# Patient Record
Sex: MALE | Race: WHITE | NOT HISPANIC OR LATINO | Employment: FULL TIME | URBAN - METROPOLITAN AREA
[De-identification: names, ages, dates, MRNs, and addresses within clinical notes are randomized per-mention and may not be internally consistent; named-entity substitution may affect disease eponyms.]

---

## 2024-03-04 ENCOUNTER — OFFICE VISIT (OUTPATIENT)
Dept: URGENT CARE | Facility: CLINIC | Age: 64
End: 2024-03-04
Payer: COMMERCIAL

## 2024-03-04 VITALS
SYSTOLIC BLOOD PRESSURE: 166 MMHG | OXYGEN SATURATION: 98 % | HEART RATE: 77 BPM | RESPIRATION RATE: 17 BRPM | WEIGHT: 216.5 LBS | DIASTOLIC BLOOD PRESSURE: 72 MMHG | TEMPERATURE: 98 F

## 2024-03-04 DIAGNOSIS — R11.2 NAUSEA AND VOMITING, UNSPECIFIED VOMITING TYPE: ICD-10-CM

## 2024-03-04 DIAGNOSIS — R10.9 RIGHT FLANK PAIN: Primary | ICD-10-CM

## 2024-03-04 DIAGNOSIS — R81 GLUCOSURIA: ICD-10-CM

## 2024-03-04 LAB
BILIRUB UR QL STRIP: NEGATIVE
GLUCOSE SERPL-MCNC: 182 MG/DL (ref 70–110)
GLUCOSE UR QL STRIP: POSITIVE
KETONES UR QL STRIP: NEGATIVE
LEUKOCYTE ESTERASE UR QL STRIP: NEGATIVE
PH, POC UA: 5 (ref 5–8)
POC BLOOD, URINE: POSITIVE
POC NITRATES, URINE: NEGATIVE
PROT UR QL STRIP: NEGATIVE
SP GR UR STRIP: 1.01 (ref 1–1.03)
UROBILINOGEN UR STRIP-ACNC: NORMAL (ref 0.3–2.2)

## 2024-03-04 PROCEDURE — 99204 OFFICE O/P NEW MOD 45 MIN: CPT | Mod: S$GLB,,, | Performed by: FAMILY MEDICINE

## 2024-03-04 PROCEDURE — 81003 URINALYSIS AUTO W/O SCOPE: CPT | Mod: QW,S$GLB,, | Performed by: FAMILY MEDICINE

## 2024-03-04 PROCEDURE — 82962 GLUCOSE BLOOD TEST: CPT | Mod: S$GLB,,, | Performed by: FAMILY MEDICINE

## 2024-03-04 RX ORDER — DICYCLOMINE HYDROCHLORIDE 10 MG/5ML
20 SOLUTION ORAL
Status: COMPLETED | OUTPATIENT
Start: 2024-03-04 | End: 2024-03-04

## 2024-03-04 RX ORDER — LIDOCAINE HYDROCHLORIDE 20 MG/ML
10 SOLUTION OROPHARYNGEAL ONCE
Status: COMPLETED | OUTPATIENT
Start: 2024-03-04 | End: 2024-03-04

## 2024-03-04 RX ORDER — LISINOPRIL 20 MG/1
20 TABLET ORAL DAILY
COMMUNITY
Start: 2024-01-02

## 2024-03-04 RX ORDER — LISINOPRIL 10 MG/1
10 TABLET ORAL DAILY
COMMUNITY
Start: 2024-02-29

## 2024-03-04 RX ORDER — ALUMINUM HYDROXIDE, MAGNESIUM HYDROXIDE, AND SIMETHICONE 1200; 120; 1200 MG/30ML; MG/30ML; MG/30ML
30 SUSPENSION ORAL
Status: COMPLETED | OUTPATIENT
Start: 2024-03-04 | End: 2024-03-04

## 2024-03-04 RX ORDER — EZETIMIBE 10 MG/1
10 TABLET ORAL DAILY
COMMUNITY
Start: 2024-02-29

## 2024-03-04 RX ORDER — ONDANSETRON HYDROCHLORIDE 8 MG/1
8 TABLET, FILM COATED ORAL EVERY 8 HOURS PRN
Qty: 12 TABLET | Refills: 0 | Status: SHIPPED | OUTPATIENT
Start: 2024-03-04

## 2024-03-04 RX ADMIN — LIDOCAINE HYDROCHLORIDE 10 ML: 20 SOLUTION OROPHARYNGEAL at 12:03

## 2024-03-04 RX ADMIN — DICYCLOMINE HYDROCHLORIDE 20 MG: 10 SOLUTION ORAL at 12:03

## 2024-03-04 RX ADMIN — ALUMINUM HYDROXIDE, MAGNESIUM HYDROXIDE, AND SIMETHICONE 30 ML: 1200; 120; 1200 SUSPENSION ORAL at 12:03

## 2024-03-04 NOTE — PATIENT INSTRUCTIONS
IT COULD BE THAT YOUR ABDOMINAL DISCOMFORT IS FROM CONSTIPATION.    IT IS REASONABLE AT THIS POINT TO TRY A FLEETS ENEMA AND SEE IF THIS GIVES YOU ANY RESULT.    IF THIS RELIEVES YOUR SYMPTOMS, THEN TRY MIRALAX 1 SCOOP DAILY FOR 3 DAYS.  IF NO RESULT AFTER 3 DAYS, INCREASE TO 2 SCOOPS DAILY FOR 3 DAYS.   IF NO RESULT AFTER 3 DAYS, INCREASE TO 3 SCOOPS DAILY.     AT ANY POINT ONCE YOU START HAVING SOFT FORMED STOOLS, DECREASE DOWN TO 1 SCOOP OF MIRALAX EVERY DAY.    BE SURE TO FOLLOW-UP WITH YOUR PRIMARY CARE PROVIDER ONCE HOME REGARDING THIS PAIN, BUT ALSO THE BLOOD IN YOUR URINE, YOUR DIABETES, AND YOUR BLOOD PRESSURE.    WITH ANY UNCONTROLLED ABDOMINAL PAIN, FEVER OR OTHER PROBLEMS, GO IMMEDIATELY TO THE EMERGENCY ROOM.

## 2024-03-04 NOTE — PROGRESS NOTES
Subjective:      Patient ID: Guy Roach is a 63 y.o. male.    Vitals:  weight is 98.2 kg (216 lb 7.9 oz). His oral temperature is 98 °F (36.7 °C). His blood pressure is 166/72 (abnormal) and his pulse is 77. His respiration is 17 and oxygen saturation is 98%.     Chief Complaint: Flank Pain    63-year-old male in town from [place] for a conference.  He felt perfectly well last evening, was out to dinner with some colleagues last night and had 3 chargrilled oysters, some crawfish etouffe and a Coke.  While drinking the Coke, he felt his stomach beginning to roil.  Between midnight and 6:00 am, he estimates 6 episodes of vomiting.  Since then he has had 2 episodes of vomiting.  His main complaint however upon arrival was right flank pain.  No dysuria or frequency or urgency.  No history of nephrolithiasis.  His thoughts are that he ate some food that was causing this nausea and vomiting.  No others with similar symptoms.  Also with complaints of constant belching/ burping.  Status post previous cholecystectomy.  No epigastric pain    Past medical history is significant for hypertension and diabetes.  He is currently on metformin which he reports causes GI issues for him.  We discussed possibility of constipation.  He reports unformed stools daily and not large which he attributes to the metformin.  He did not have a BM yesterday.    He did not take his diabetes medication or blood pressure medication this morning.  He has had no fever    Prior to this appointment he did take some Maalox with very brief benefit (10 minutes) of the gaseous upset.    While here in urgent Care, he was given a GI cocktail.  This resulted in resolution of the right flank pain but now he reports bilateral lower abdominal pressure/slight upset. (nontender on repeat exam).          Flank Pain  The current episode started yesterday. The problem occurs constantly. The problem is unchanged. The quality of the pain is described as aching. The  pain is at a severity of 4/10. The pain is mild. The pain is The same all the time. Pertinent negatives include no abdominal pain, bladder incontinence, bowel incontinence, chest pain, dysuria, fever, headaches, leg pain, numbness, paresis, paresthesias, pelvic pain, perianal numbness, tingling, weakness or weight loss. He has tried nothing for the symptoms.       Constitution: Negative for fever.   Cardiovascular:  Negative for chest pain.   Gastrointestinal:  Negative for abdominal pain and bowel incontinence.   Genitourinary:  Positive for flank pain. Negative for dysuria, bladder incontinence and pelvic pain.   Neurological:  Negative for headaches and numbness.      Objective:     Physical Exam   Constitutional: He is oriented to person, place, and time. He appears well-developed.  Non-toxic appearance. He does not appear ill. No distress.   HENT:   Head: Normocephalic and atraumatic.   Ears:   Right Ear: External ear normal.   Left Ear: External ear normal.   Cardiovascular: Normal rate and regular rhythm.   Pulmonary/Chest: Effort normal. No stridor. No respiratory distress. He has no wheezes. He has no rhonchi. He has no rales.   Abdominal: Normal appearance.      Comments: Bowel sounds are normoactive.  Abdomen appears somewhat distended to me, but he reports this as normal.  It feels gaseous to palpation.      On initial exam, only tenderness elicited was minimal tenderness under lower right rib.  No guarding rigidity or rebound.  No CVA pain.    After GI cocktail abdominal exam was completely nontender.  Still feeling gaseous to palpation, although again he reports this as usual for him   Neurological: He is alert and oriented to person, place, and time.   Skin: Skin is not diaphoretic.   Psychiatric: His behavior is normal. Thought content normal.   Nursing note and vitals reviewed.    Results for orders placed or performed in visit on 03/04/24   POCT Urinalysis, Dipstick, Automated, W/O Scope   Result  Value Ref Range    POC Blood, Urine Positive (A) Negative    POC Bilirubin, Urine Negative Negative    POC Urobilinogen, Urine normal 0.3 - 2.2    POC Ketones, Urine Negative Negative    POC Protein, Urine Negative Negative    POC Nitrates, Urine Negative Negative    POC Glucose, Urine Positive (A) Negative    pH, UA 5.0 5 - 8    POC Specific Gravity, Urine 1.015 1.003 - 1.029    POC Leukocytes, Urine Negative Negative   POCT Glucose, Hand-Held Device   Result Value Ref Range    POC Glucose 182 (A) 70 - 110 MG/DL      Assessment:     1. Right flank pain    2. Nausea and vomiting, unspecified vomiting type    3. Glucosuria        Plan:       Right flank pain  -     POCT Urinalysis, Dipstick, Automated, W/O Scope    Nausea and vomiting, unspecified vomiting type  -     ondansetron (ZOFRAN) 8 MG tablet; Take 1 tablet (8 mg total) by mouth every 8 (eight) hours as needed for Nausea.  Dispense: 12 tablet; Refill: 0  -     dicyclomine 10 mg/5 mL syrup 20 mg  -     LIDOcaine viscous HCl 2% oral solution 10 mL  -     aluminum-magnesium hydroxide-simethicone 200-200-20 mg/5 mL suspension 30 mL    Glucosuria  -     POCT Glucose, Hand-Held Device    IT COULD BE THAT YOUR ABDOMINAL DISCOMFORT IS FROM CONSTIPATION.    IT IS REASONABLE AT THIS POINT TO TRY A FLEETS ENEMA AND SEE IF THIS GIVES YOU ANY RESULT.    IF THIS RELIEVES YOUR SYMPTOMS, THEN TRY MIRALAX 1 SCOOP DAILY FOR 3 DAYS.  IF NO RESULT AFTER 3 DAYS, INCREASE TO 2 SCOOPS DAILY FOR 3 DAYS.   IF NO RESULT AFTER 3 DAYS, INCREASE TO 3 SCOOPS DAILY.     AT ANY POINT ONCE YOU START HAVING SOFT FORMED STOOLS, DECREASE DOWN TO 1 SCOOP OF MIRALAX EVERY DAY.    BE SURE TO FOLLOW-UP WITH YOUR PRIMARY CARE PROVIDER ONCE HOME REGARDING THIS PAIN, BUT ALSO THE BLOOD IN YOUR URINE, YOUR DIABETES, AND YOUR BLOOD PRESSURE.    WITH ANY UNCONTROLLED ABDOMINAL PAIN, FEVER OR OTHER PROBLEMS, GO IMMEDIATELY TO THE EMERGENCY ROOM.

## 2025-06-27 ENCOUNTER — AMBULATORY SURGICAL CENTER (OUTPATIENT)
Dept: URBAN - METROPOLITAN AREA SURGERY 3 | Facility: SURGERY | Age: 65
End: 2025-06-27
Payer: COMMERCIAL

## 2025-06-27 ENCOUNTER — OFFICE (OUTPATIENT)
Dept: URBAN - METROPOLITAN AREA PATHOLOGY 12 | Facility: PATHOLOGY | Age: 65
End: 2025-06-27
Payer: COMMERCIAL

## 2025-06-27 VITALS
DIASTOLIC BLOOD PRESSURE: 76 MMHG | SYSTOLIC BLOOD PRESSURE: 133 MMHG | HEART RATE: 70 BPM | RESPIRATION RATE: 16 BRPM | WEIGHT: 197.6 LBS | OXYGEN SATURATION: 96 % | DIASTOLIC BLOOD PRESSURE: 76 MMHG | HEART RATE: 792 BPM | HEART RATE: 792 BPM | OXYGEN SATURATION: 95 % | WEIGHT: 197.6 LBS | SYSTOLIC BLOOD PRESSURE: 112 MMHG | HEIGHT: 70 IN | OXYGEN SATURATION: 93 % | SYSTOLIC BLOOD PRESSURE: 145 MMHG | OXYGEN SATURATION: 93 % | RESPIRATION RATE: 20 BRPM | HEART RATE: 76 BPM | RESPIRATION RATE: 20 BRPM | SYSTOLIC BLOOD PRESSURE: 126 MMHG | SYSTOLIC BLOOD PRESSURE: 105 MMHG | DIASTOLIC BLOOD PRESSURE: 58 MMHG | TEMPERATURE: 98.1 F | SYSTOLIC BLOOD PRESSURE: 106 MMHG | HEART RATE: 79 BPM | OXYGEN SATURATION: 99 % | RESPIRATION RATE: 16 BRPM | SYSTOLIC BLOOD PRESSURE: 133 MMHG | HEART RATE: 85 BPM | DIASTOLIC BLOOD PRESSURE: 61 MMHG | DIASTOLIC BLOOD PRESSURE: 80 MMHG | DIASTOLIC BLOOD PRESSURE: 64 MMHG | HEIGHT: 70 IN | DIASTOLIC BLOOD PRESSURE: 61 MMHG | DIASTOLIC BLOOD PRESSURE: 59 MMHG | HEART RATE: 70 BPM | HEART RATE: 84 BPM | HEART RATE: 85 BPM | RESPIRATION RATE: 17 BRPM | DIASTOLIC BLOOD PRESSURE: 80 MMHG | RESPIRATION RATE: 17 BRPM | OXYGEN SATURATION: 99 % | TEMPERATURE: 98.4 F | OXYGEN SATURATION: 95 % | SYSTOLIC BLOOD PRESSURE: 126 MMHG | SYSTOLIC BLOOD PRESSURE: 145 MMHG | OXYGEN SATURATION: 96 % | RESPIRATION RATE: 18 BRPM | HEART RATE: 76 BPM | RESPIRATION RATE: 19 BRPM | TEMPERATURE: 98.1 F | HEART RATE: 79 BPM | DIASTOLIC BLOOD PRESSURE: 59 MMHG | SYSTOLIC BLOOD PRESSURE: 112 MMHG | OXYGEN SATURATION: 97 % | SYSTOLIC BLOOD PRESSURE: 105 MMHG | DIASTOLIC BLOOD PRESSURE: 64 MMHG | RESPIRATION RATE: 18 BRPM | SYSTOLIC BLOOD PRESSURE: 106 MMHG | HEART RATE: 84 BPM | OXYGEN SATURATION: 97 % | RESPIRATION RATE: 19 BRPM | TEMPERATURE: 98.4 F | DIASTOLIC BLOOD PRESSURE: 58 MMHG

## 2025-06-27 DIAGNOSIS — D12.4 BENIGN NEOPLASM OF DESCENDING COLON: ICD-10-CM

## 2025-06-27 DIAGNOSIS — Z12.11 ENCOUNTER FOR SCREENING FOR MALIGNANT NEOPLASM OF COLON: ICD-10-CM

## 2025-06-27 PROBLEM — K63.5 POLYP OF COLON: Status: ACTIVE | Noted: 2025-06-27

## 2025-06-27 PROCEDURE — 88305 TISSUE EXAM BY PATHOLOGIST: CPT | Performed by: PATHOLOGY

## 2025-06-27 PROCEDURE — 45385 COLONOSCOPY W/LESION REMOVAL: CPT | Mod: 33 | Performed by: INTERNAL MEDICINE

## 2025-06-30 LAB
GASTRO ONE PATHOLOGY: PDF REPORT: (no result)
GASTRO ONE PATHOLOGY: PDF REPORT: (no result)